# Patient Record
Sex: MALE | Race: WHITE | NOT HISPANIC OR LATINO | URBAN - METROPOLITAN AREA
[De-identification: names, ages, dates, MRNs, and addresses within clinical notes are randomized per-mention and may not be internally consistent; named-entity substitution may affect disease eponyms.]

---

## 2024-01-27 ENCOUNTER — OFFICE VISIT (OUTPATIENT)
Dept: URGENT CARE | Facility: CLINIC | Age: 24
End: 2024-01-27
Payer: COMMERCIAL

## 2024-01-27 VITALS
TEMPERATURE: 98 F | HEART RATE: 81 BPM | WEIGHT: 139.75 LBS | HEIGHT: 70 IN | OXYGEN SATURATION: 100 % | BODY MASS INDEX: 20.01 KG/M2 | SYSTOLIC BLOOD PRESSURE: 115 MMHG | RESPIRATION RATE: 10 BRPM | DIASTOLIC BLOOD PRESSURE: 78 MMHG

## 2024-01-27 DIAGNOSIS — M25.511 ACUTE PAIN OF RIGHT SHOULDER: ICD-10-CM

## 2024-01-27 DIAGNOSIS — R07.1 CHEST PAIN ON BREATHING: ICD-10-CM

## 2024-01-27 DIAGNOSIS — S46.811A STRAIN OF RIGHT TRAPEZIUS MUSCLE, INITIAL ENCOUNTER: ICD-10-CM

## 2024-01-27 DIAGNOSIS — R20.0 NUMBNESS AND TINGLING OF RIGHT ARM: ICD-10-CM

## 2024-01-27 DIAGNOSIS — R20.2 NUMBNESS AND TINGLING OF RIGHT ARM: ICD-10-CM

## 2024-01-27 DIAGNOSIS — R07.9 CHEST PAIN, UNSPECIFIED TYPE: Primary | ICD-10-CM

## 2024-01-27 PROCEDURE — 99204 OFFICE O/P NEW MOD 45 MIN: CPT | Mod: 25,S$GLB,, | Performed by: NURSE PRACTITIONER

## 2024-01-27 PROCEDURE — 93005 ELECTROCARDIOGRAM TRACING: CPT | Mod: S$GLB,,, | Performed by: NURSE PRACTITIONER

## 2024-01-27 PROCEDURE — 93010 ELECTROCARDIOGRAM REPORT: CPT | Mod: S$GLB,,, | Performed by: INTERNAL MEDICINE

## 2024-01-27 PROCEDURE — 71046 X-RAY EXAM CHEST 2 VIEWS: CPT | Mod: S$GLB,,, | Performed by: RADIOLOGY

## 2024-01-27 PROCEDURE — 96372 THER/PROPH/DIAG INJ SC/IM: CPT | Mod: S$GLB,,, | Performed by: NURSE PRACTITIONER

## 2024-01-27 RX ORDER — KETOROLAC TROMETHAMINE 30 MG/ML
30 INJECTION, SOLUTION INTRAMUSCULAR; INTRAVENOUS
Status: COMPLETED | OUTPATIENT
Start: 2024-01-27 | End: 2024-01-27

## 2024-01-27 RX ADMIN — KETOROLAC TROMETHAMINE 30 MG: 30 INJECTION, SOLUTION INTRAMUSCULAR; INTRAVENOUS at 04:01

## 2024-01-27 NOTE — PATIENT INSTRUCTIONS
Rest  Hydration/increase fluids  Hot showers  Heating pad  OTC Topical Pain relievers as directed: SalonPas, Tiger Balm,  Tylenol every 6 hours as needed for pain  You received a Ketoralac injection in clinic today-AVOID additional NSAIDs until tomorrow  Initiate Methocarbamol and take as directed  Signs and symptoms of worsening discussed  Follow up as needed/with worsening  Present to ED for s/s emergent changes/worsening

## 2024-01-27 NOTE — PROGRESS NOTES
"Subjective:      Patient ID: Vamsi Chan is a 23 y.o. male.    Vitals:  height is 5' 10" (1.778 m) and weight is 63.4 kg (139 lb 12.4 oz). His tympanic temperature is 98 °F (36.7 °C). His blood pressure is 115/78 and his pulse is 81. His respiration is 10 and oxygen saturation is 100%.     Chief Complaint: Chest Pain    23 year old male presents for evaluation of chest pain (right side, constant squeezing sensation 4/10, pain increases up to 8/10 sharp sensation with deep breaths) and right arm numbness (down to right elbow) and tingling (down to right hand). States that pain started immediately after losing a $20 sports bet when he transitioned from lying down to sitting up. Reports that he is unable to take a deep breath because of the pain. Reports worsening pain/numbness/tingling since onset. Denies caffeine/energy drink/coffee consumption today. Denies tobacco use. 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 Denies heavy lifting for over a year secondary to injury. Has RX for Methocarbamol but needs to  from the pharmacy. Did not take any medications for symptoms.    Chest Pain   This is a new problem. The current episode started today. The onset quality is sudden. The problem occurs constantly. The problem has been gradually worsening. The pain is present in the substernal region. The pain is at a severity of 6/10. The pain is moderate. The quality of the pain is described as squeezing and sharp (when breathing deep it's a sharp pain). The pain radiates to the right arm. Associated symptoms include numbness (right arm to elbow). Pertinent negatives include no abdominal pain, back pain, claudication, cough, diaphoresis, dizziness, exertional chest pressure, fever, headaches, hemoptysis, irregular heartbeat, leg pain, lower extremity edema, malaise/fatigue, nausea, near-syncope, orthopnea, palpitations, PND, shortness of breath, sputum production, syncope, vomiting or weakness. The pain is " aggravated by breathing. He has tried nothing for the symptoms.   Pertinent negatives for past medical history include no seizures.       Constitution: Negative. Negative for sweating and fever.   HENT: Negative.     Neck: neck negative.   Cardiovascular:  Positive for chest pain (right chest squeezing sensation). Negative for leg swelling, palpitations, sob on exertion and passing out.   Eyes: Negative.    Respiratory:  Negative for cough, sputum production, bloody sputum, shortness of breath and wheezing.    Gastrointestinal: Negative.  Negative for abdominal pain, nausea and vomiting.   Endocrine: negative.   Genitourinary: Negative.    Musculoskeletal:  Positive for joint pain (right shoulder). Negative for back pain.   Skin: Negative.    Allergic/Immunologic: Negative.    Neurological:  Positive for numbness (right arm to elbow) and tingling (right arm to hand). Negative for dizziness, history of vertigo, light-headedness, passing out, facial drooping, speech difficulty, coordination disturbances, loss of balance, headaches, seizures and tremors.   Hematologic/Lymphatic: Negative.    Psychiatric/Behavioral: Negative.        Objective:     Physical Exam   Constitutional: He is oriented to person, place, and time. He is cooperative.  Non-toxic appearance. He does not appear ill. He appears distressed. normalawake  HENT:   Head: Normocephalic and atraumatic.   Ears:   Right Ear: Hearing normal.   Left Ear: Hearing normal.   Eyes: Conjunctivae are normal. Pupils are equal, round, and reactive to light. No visual field deficit is present. Right eye exhibits no discharge. Left eye exhibits no discharge. No scleral icterus. Extraocular movement intact   Neck: Neck supple.   Cardiovascular: Normal rate, regular rhythm and normal heart sounds.   Pulmonary/Chest: Effort normal and breath sounds normal. No stridor. No respiratory distress. He has no wheezes. He has no rhonchi. He has no rales. He exhibits no tenderness.        Abdominal: Normal appearance.   Musculoskeletal: Normal range of motion.         General: Tenderness present. Normal range of motion.      Right shoulder: He exhibits tenderness and bony tenderness. He exhibits normal range of motion, no swelling, no effusion, no crepitus, no deformity, no laceration, normal pulse and normal strength.      Left shoulder: Normal.      Right elbow: Normal.     Right wrist: Normal.      Right upper arm: Normal. He exhibits no tenderness, no bony tenderness, no swelling, no edema, no deformity and no laceration.      Right forearm: Normal.        Arms:       Right hand: Normal.      Right lower leg: No edema.      Left lower leg: No edema.      Comments: Localized pain, TTP     Neurological: no focal deficit. He is alert and oriented to person, place, and time. He has normal motor skills, normal sensation and intact cranial nerves (2-12). He displays no weakness, no atrophy, no tremor, facial symmetry and no dysarthria. No cranial nerve deficit. He exhibits normal muscle tone. Gait and coordination normal.   Skin: Skin is warm, dry and not diaphoretic.   Psychiatric: His behavior is normal. Mood, judgment and thought content normal.   Nursing note and vitals reviewed.    XR CHEST PA AND LATERAL    Result Date: 1/27/2024  EXAM: XR CHEST PA AND LATERAL CLINICAL HISTORY:  Chest pain PRIOR:  NONE FINDINGS: The cardiomediastinal is normal and the lungs are clear.     No acute cardiopulmonary disease. Finalized on: 1/27/2024 4:07 PM By:  Stacy Huizar MD [facility]# [id number]      2024-01-27 16:[id number]    [facility]     EKG: normal EKG, normal sinus rhythm, there are no previous tracings available for comparison, normal sinus rhythm.    Assessment:     1. Chest pain, unspecified type    2. Acute pain of right shoulder    3. Strain of right trapezius muscle, initial encounter    4. Numbness and tingling of right arm    5. Chest pain on breathing        Plan:       Chest pain, unspecified type  -      XR CHEST PA AND LATERAL; Future; Expected date: 01/27/2024  -     IN OFFICE EKG 12-LEAD (to Muse)  -     ketorolac injection 30 mg    Acute pain of right shoulder  -     ketorolac injection 30 mg    Strain of right trapezius muscle, initial encounter  -     ketorolac injection 30 mg    Numbness and tingling of right arm    Chest pain on breathing           Patient presents for evaluation of right chest pain that is consistent with myalgia. Decision to perform EKG and CXR to rule out cardiac/pulmonary etiology, (-) findings discussed. Decision to administer Ketoralac 30 mg IM to manage pain and inflammation. Plan is to manage symptoms, prevent worsening, and follow up as needed/with worsening.discussed with patient who verbalizes understanding.        Patient Instructions   Rest  Hydration/increase fluids  Hot showers  Heating pad  OTC Topical Pain relievers as directed: SalonPas, Tiger Balm,  Tylenol every 6 hours as needed for pain  You received a Ketoralac injection in clinic today-AVOID additional NSAIDs until tomorrow  Initiate Methocarbamol and take as directed  Signs and symptoms of worsening discussed  Follow up as needed/with worsening  Present to ED for s/s emergent changes/worsening

## 2024-01-28 ENCOUNTER — TELEPHONE (OUTPATIENT)
Dept: URGENT CARE | Facility: CLINIC | Age: 24
End: 2024-01-28
Payer: COMMERCIAL

## 2024-01-30 ENCOUNTER — TELEPHONE (OUTPATIENT)
Dept: URGENT CARE | Facility: CLINIC | Age: 24
End: 2024-01-30
Payer: COMMERCIAL

## 2024-01-30 DIAGNOSIS — R94.31 ABNORMAL EKG: Primary | ICD-10-CM

## 2024-02-01 ENCOUNTER — OFFICE VISIT (OUTPATIENT)
Dept: CARDIOLOGY | Facility: CLINIC | Age: 24
End: 2024-02-01
Payer: COMMERCIAL

## 2024-02-01 ENCOUNTER — LAB VISIT (OUTPATIENT)
Dept: LAB | Facility: HOSPITAL | Age: 24
End: 2024-02-01
Attending: INTERNAL MEDICINE
Payer: COMMERCIAL

## 2024-02-01 VITALS
OXYGEN SATURATION: 99 % | DIASTOLIC BLOOD PRESSURE: 76 MMHG | WEIGHT: 139.31 LBS | SYSTOLIC BLOOD PRESSURE: 114 MMHG | BODY MASS INDEX: 19.94 KG/M2 | HEART RATE: 94 BPM | HEIGHT: 70 IN

## 2024-02-01 DIAGNOSIS — R94.31 EKG ABNORMALITIES: ICD-10-CM

## 2024-02-01 DIAGNOSIS — R07.89 OTHER CHEST PAIN: ICD-10-CM

## 2024-02-01 DIAGNOSIS — R07.89 OTHER CHEST PAIN: Primary | ICD-10-CM

## 2024-02-01 LAB — D DIMER PPP IA.FEU-MCNC: <0.19 MG/L FEU

## 2024-02-01 PROCEDURE — 85379 FIBRIN DEGRADATION QUANT: CPT | Performed by: INTERNAL MEDICINE

## 2024-02-01 PROCEDURE — 3008F BODY MASS INDEX DOCD: CPT | Mod: CPTII,S$GLB,, | Performed by: INTERNAL MEDICINE

## 2024-02-01 PROCEDURE — 99999 PR PBB SHADOW E&M-EST. PATIENT-LVL III: CPT | Mod: PBBFAC,,, | Performed by: INTERNAL MEDICINE

## 2024-02-01 PROCEDURE — 3074F SYST BP LT 130 MM HG: CPT | Mod: CPTII,S$GLB,, | Performed by: INTERNAL MEDICINE

## 2024-02-01 PROCEDURE — 99204 OFFICE O/P NEW MOD 45 MIN: CPT | Mod: S$GLB,,, | Performed by: INTERNAL MEDICINE

## 2024-02-01 PROCEDURE — 3078F DIAST BP <80 MM HG: CPT | Mod: CPTII,S$GLB,, | Performed by: INTERNAL MEDICINE

## 2024-02-01 PROCEDURE — 1159F MED LIST DOCD IN RCRD: CPT | Mod: CPTII,S$GLB,, | Performed by: INTERNAL MEDICINE

## 2024-02-01 PROCEDURE — 36415 COLL VENOUS BLD VENIPUNCTURE: CPT | Performed by: INTERNAL MEDICINE

## 2024-02-01 PROCEDURE — 1160F RVW MEDS BY RX/DR IN RCRD: CPT | Mod: CPTII,S$GLB,, | Performed by: INTERNAL MEDICINE

## 2024-02-01 NOTE — PROGRESS NOTES
Subjective:   Patient ID:  Vamsi Chan is a 23 y.o. male who presents for evaluation of Chest Pain (Sharp chest pain that occurred while watching tv, would also hurt when he took deep breathes ) and Numbness (Numbness in right arm)      22 yo male, referred by Chaparrita for abnl EKG  PMH L5 S1 lumbar disc injured 1 yr ago at  training.     5 days ago, when watching the basketball in the dorm,  abrupt chest pain on right side, radiating tonight arm and shoulder. Associated SOB nausea cold sweating. Quickly Went to [facility] urgent [facility]. CXRnegative.   EKG reviewed by myself today reveals NSR nonspecific STT change, pulm pattern,  Given Toradol IM and d/c home  Took muscle relaxant at home. Improved pain and SOB. Arm sore. Next day resolved,  Occasional left side chest pain  No smoking, occasional drinking, no drugs  No surgical history. H/o + strep throat infection Rx with ABx  No f/h premature SCD  jogging            No results found for this or any previous visit.     No results found for this or any previous visit.       History reviewed. No pertinent past medical history.    History reviewed. No pertinent surgical history.    Social History     Tobacco Use    Smoking status: Never     Passive exposure: Never    Smokeless tobacco: Never   Substance Use Topics    Alcohol use: Yes    Drug use: Never       History reviewed. No pertinent family history.    Review of Systems   Constitutional: Negative for decreased appetite, diaphoresis, fever, malaise/fatigue and night sweats.   HENT:  Negative for nosebleeds.    Eyes:  Negative for blurred vision and double vision.   Cardiovascular:  Positive for chest pain. Negative for claudication, dyspnea on exertion, irregular heartbeat, leg swelling, near-syncope, orthopnea, palpitations, paroxysmal nocturnal dyspnea and syncope.   Respiratory:  Negative for cough, shortness of breath, sleep disturbances due to breathing, snoring, sputum production and wheezing.   "  Endocrine: Negative for cold intolerance and polyuria.   Hematologic/Lymphatic: Does not bruise/bleed easily.   Skin:  Negative for rash.   Musculoskeletal:  Positive for back pain. Negative for falls, joint pain, joint swelling and neck pain.   Gastrointestinal:  Negative for abdominal pain, heartburn, nausea and vomiting.   Genitourinary:  Negative for dysuria, frequency and hematuria.   Neurological:  Negative for difficulty with concentration, dizziness, focal weakness, headaches, light-headedness, numbness, seizures and weakness.   Psychiatric/Behavioral:  Negative for depression, memory loss and substance abuse. The patient does not have insomnia.    Allergic/Immunologic: Negative for HIV exposure and hives.       Objective:   Physical Exam  HENT:      Head: Normocephalic.   Eyes:      Pupils: Pupils are equal, round, and reactive to light.   Neck:      Thyroid: No thyromegaly.      Vascular: Normal carotid pulses. No carotid bruit or JVD.   Cardiovascular:      Rate and Rhythm: Normal rate and regular rhythm. No extrasystoles are present.     Chest Wall: PMI is not displaced.      Pulses: Normal pulses.      Heart sounds: Normal heart sounds. No murmur heard.     No gallop. No S3 sounds.   Pulmonary:      Effort: No respiratory distress.      Breath sounds: Normal breath sounds. No stridor.   Abdominal:      General: Bowel sounds are normal.      Palpations: Abdomen is soft.      Tenderness: There is no abdominal tenderness. There is no rebound.   Skin:     Findings: No rash.   Neurological:      Mental Status: He is alert and oriented to person, place, and time.   Psychiatric:         Behavior: Behavior normal.         No results found for: "CHOL"  No results found for: "HDL"  No results found for: "LDLCALC"  No results found for: "TRIG"  No results found for: "CHOLHDL"    Chemistry    No results found for: "NA", "K", "CL", "CO2", "BUN", "CREATININE", "GLU" No results found for: "CALCIUM", "ALKPHOS", " ""AST", "ALT", "BILITOT", "ESTGFRAFRICA", "EGFRNONAA"       No results found for: "LABA1C", "HGBA1C"  No results found for: "TSH"  No results found for: "INR", "PROTIME"  No results found for: "WBC", "HGB", "HCT", "MCV", "PLT"  BNP  @LABRCNTIP(BNP,BNPTRIAGEBLO)@  CrCl cannot be calculated (No successful lab value found.).  No results found in the last 24 hours.  No results found in the last 24 hours.  No results found in the last 24 hours.    Assessment:      1. Other chest pain    2. EKG abnormalities        Plan:   Other chest pain  -     D-Dimer, Quantitative; Future; Expected date: 02/01/2024  -     Echo; Future    EKG abnormalities  -     D-Dimer, Quantitative; Future; Expected date: 02/01/2024  -     Echo; Future          D dimer r/o pE  Echo r/o pericardial effusion and other etiologies for CP and abnl EKG  Phone review    Pain control per PCP       "

## 2024-02-02 ENCOUNTER — TELEPHONE (OUTPATIENT)
Dept: CARDIOLOGY | Facility: CLINIC | Age: 24
End: 2024-02-02
Payer: COMMERCIAL

## 2024-02-02 NOTE — TELEPHONE ENCOUNTER
Attempted to contact patient; LVM for patient to call back for results.         ----- Message from Ac Metcalf MD sent at 2/1/2024  6:17 PM CST -----  D dimer normal . No acute PE

## 2024-02-07 ENCOUNTER — TELEPHONE (OUTPATIENT)
Dept: CARDIOLOGY | Facility: CLINIC | Age: 24
End: 2024-02-07
Payer: COMMERCIAL

## 2024-02-07 ENCOUNTER — HOSPITAL ENCOUNTER (OUTPATIENT)
Dept: CARDIOLOGY | Facility: HOSPITAL | Age: 24
Discharge: HOME | End: 2024-02-07
Attending: INTERNAL MEDICINE
Payer: COMMERCIAL

## 2024-02-07 DIAGNOSIS — R07.89 OTHER CHEST PAIN: ICD-10-CM

## 2024-02-07 DIAGNOSIS — R94.31 EKG ABNORMALITIES: ICD-10-CM

## 2024-02-07 LAB
AV INDEX (PROSTH): 0.84
AV MEAN GRADIENT: 3 MMHG
AV PEAK GRADIENT: 5 MMHG
AV VALVE AREA BY VELOCITY RATIO: 2.83 CM²
AV VALVE AREA: 2.72 CM²
AV VELOCITY RATIO: 0.87
CV ECHO LV RWT: 0.4 CM
DOP CALC AO PEAK VEL: 1.11 M/S
DOP CALC AO VTI: 21.5 CM
DOP CALC LVOT AREA: 3.2 CM2
DOP CALC LVOT DIAMETER: 2.03 CM
DOP CALC LVOT PEAK VEL: 0.97 M/S
DOP CALC LVOT STROKE VOLUME: 58.55 CM3
DOP CALC RVOT PEAK VEL: 1.06 M/S
DOP CALC RVOT VTI: 21.3 CM
DOP CALCLVOT PEAK VEL VTI: 18.1 CM
E WAVE DECELERATION TIME: 301.05 MSEC
E/A RATIO: 1.59
E/E' RATIO: 7.36 M/S
ECHO LV POSTERIOR WALL: 0.9 CM (ref 0.6–1.1)
EJECTION FRACTION: 55 %
FRACTIONAL SHORTENING: 34 % (ref 28–44)
INTERVENTRICULAR SEPTUM: 0.9 CM (ref 0.6–1.1)
IVRT: 106.57 MSEC
LA MAJOR: 3.48 CM
LA MINOR: 3.71 CM
LA WIDTH: 3.1 CM
LEFT ATRIUM SIZE: 2.69 CM
LEFT ATRIUM VOLUME MOD: 23.2 CM3
LEFT ATRIUM VOLUME: 25.46 CM3
LEFT INTERNAL DIMENSION IN SYSTOLE: 2.93 CM (ref 2.1–4)
LEFT VENTRICLE DIASTOLIC VOLUME: 91 ML
LEFT VENTRICLE SYSTOLIC VOLUME: 33.01 ML
LEFT VENTRICULAR INTERNAL DIMENSION IN DIASTOLE: 4.47 CM (ref 3.5–6)
LEFT VENTRICULAR MASS: 131.37 G
LV LATERAL E/E' RATIO: 7.36 M/S
LV SEPTAL E/E' RATIO: 7.36 M/S
LVOT MG: 2.08 MMHG
LVOT MV: 0.67 CM/S
MV PEAK A VEL: 0.51 M/S
MV PEAK E VEL: 0.81 M/S
PISA TR MAX VEL: 2.32 M/S
PULM VEIN S/D RATIO: 1.11
PV MEAN GRADIENT: 2 MMHG
PV MV: 0.7 M/S
PV PEAK D VEL: 0.62 M/S
PV PEAK GRADIENT: 4 MMHG
PV PEAK S VEL: 0.69 M/S
PV PEAK VELOCITY: 1 M/S
RA MAJOR: 4.12 CM
RA PRESSURE ESTIMATED: 3 MMHG
RA WIDTH: 2.81 CM
RIGHT VENTRICULAR END-DIASTOLIC DIMENSION: 3.41 CM
RV TB RVSP: 5 MMHG
SINUS: 2.74 CM
STJ: 2.65 CM
TDI LATERAL: 0.11 M/S
TDI SEPTAL: 0.11 M/S
TDI: 0.11 M/S
TR MAX PG: 22 MMHG
TRICUSPID ANNULAR PLANE SYSTOLIC EXCURSION: 2.43 CM
TV REST PULMONARY ARTERY PRESSURE: 25 MMHG

## 2024-02-07 PROCEDURE — 93306 TTE W/DOPPLER COMPLETE: CPT | Mod: 26,,, | Performed by: INTERNAL MEDICINE

## 2024-02-07 PROCEDURE — 93306 TTE W/DOPPLER COMPLETE: CPT

## 2024-02-07 NOTE — TELEPHONE ENCOUNTER
Lvm to pt in regards to results      ----- Message from Ac Metcalf MD sent at 2/7/2024  4:32 PM CST -----  Echo showed normal function and valvular structures  No pericardial effusion  Continue motrin rx as needed  hydration

## 2024-09-17 ENCOUNTER — OFFICE VISIT (OUTPATIENT)
Dept: URGENT CARE | Facility: CLINIC | Age: 24
End: 2024-09-17
Payer: COMMERCIAL

## 2024-09-17 VITALS
RESPIRATION RATE: 18 BRPM | WEIGHT: 146.5 LBS | TEMPERATURE: 99 F | HEIGHT: 70 IN | HEART RATE: 97 BPM | DIASTOLIC BLOOD PRESSURE: 59 MMHG | SYSTOLIC BLOOD PRESSURE: 120 MMHG | BODY MASS INDEX: 20.97 KG/M2 | OXYGEN SATURATION: 97 %

## 2024-09-17 DIAGNOSIS — J02.9 SORE THROAT: ICD-10-CM

## 2024-09-17 DIAGNOSIS — J02.9 VIRAL PHARYNGITIS: Primary | ICD-10-CM

## 2024-09-17 LAB
CTP QC/QA: YES
CTP QC/QA: YES
MOLECULAR STREP A: NEGATIVE
SARS-COV-2 AG RESP QL IA.RAPID: NEGATIVE

## 2024-09-17 PROCEDURE — 87651 STREP A DNA AMP PROBE: CPT | Mod: QW,S$GLB,,

## 2024-09-17 PROCEDURE — 87811 SARS-COV-2 COVID19 W/OPTIC: CPT | Mod: QW,S$GLB,,

## 2024-09-17 PROCEDURE — 99213 OFFICE O/P EST LOW 20 MIN: CPT | Mod: S$GLB,,,

## 2024-09-17 NOTE — PATIENT INSTRUCTIONS
PLEASE READ YOUR DISCHARGE INSTRUCTIONS ENTIRELY AS IT CONTAINS IMPORTANT INFORMATION.    Please drink plenty of fluids.    Please get plenty of rest.    Please return here or go to the Emergency Department for any concerns or worsening of condition.    Please take an over the counter antihistamine medication (Allegra/Claritin/Zyrtec) of your choice as directed for allergy symptoms and/or runny nose and postnasal drip.    Try an over the counter decongestant for sinus pressure/ear pressure, congestion symptoms like Mucinex D or Sudafed or Phenylephrine. You buy this behind the pharmacy counter.    If you do have Hypertension or palpitations, it is safe to take Coricidin HBP for relief of sinus symptoms.    Tylenol or ibuprofen can also be used as directed for pain and fever unless you have an allergy to them or medical condition such as stomach ulcers, kidney or liver disease or blood thinners etc for which you should not be taking these type of medications.     Sore throat recommendations: Warm fluids, warm salt water gargles, throat lozenges, tea, honey, soup, rest, hydration.    Use over the counter Flonase or Nasocort: one spray each nostril twice daily OR two sprays each nostril once daily until nares dry out, unless you have Glaucoma.   Can also supplement with nasal saline rinse.    Sinus rinses DO NOT USE TAP WATER, if you must, water must be at a rolling boil for 1 minute, let it cool, then use.  May use distilled water, or over the counter nasal saline rinses.  Amdhav's vapor rub in shower to help open nasal passages.  May use nasal gel to keep passages moisturized.  May use nasal saline sprays during the day for added relief of congestion.   For those who go to the gym, please do not use the sauna or steam room now to clear sinuses.    Cough     Rest and fluids are important  Can use honey with debra to soothe your throat    Robitussin or Delsyum for cough suppressant for dry cough.    Mucinex DM or  products containing Guaifenesin or Dextromethorphan for expectorant (wet cough).    Take prescription cough meds (pills) as prescribed; take prescription cough syrup at night as needed for cough.  Do not take both the prescribed cough pills and syrup at the same time or within 6 hours of each other.  Do not take the cough syrup with any other sedative medication as it can can cause drowsiness. Do not operate any heavy machinery, drink or drive while taking the cough syrup.     Please follow up with your primary care doctor or specialist in the next 48-72hrs as needed and if no improvement    If you smoke, please stop smoking.    Please return or see your primary care doctor if you develop new or worsening symptoms.     Lastly, good hand washing and cough hygiene (cough into your elbow) will help prevent the spread of the illness. A general rule is that you are no longer contagious once you have been without a fever for over 24 hours without requiring fever reducing medications.     Please arrange follow up with your primary medical clinic as soon as possible. You must understand that you've received an Urgent Care treatment only and that you may be released before all of your medical problems are known or treated. You, the patient, will arrange for follow up as instructed. If your symptoms worsen or fail to improve you should go to the Emergency Room.

## 2024-09-17 NOTE — PROGRESS NOTES
"Subjective:      Patient ID: Vamsi Chan is a 23 y.o. male.    Vitals:  height is 5' 10" (1.778 m) and weight is 66.4 kg (146 lb 7.9 oz). His oral temperature is 99.1 °F (37.3 °C). His blood pressure is 120/59 (abnormal) and his pulse is 97. His respiration is 18 and oxygen saturation is 97%.     Chief Complaint: Sore Throat    Vamsi Chan is a 23 y.o. male who presents for sore throat which onset 2 days ago. Associated sxs include HA. Patient denies any fever, chills, SOB, CP, abd pain, n/v/d, rash, dizziness, or numbness/tingling. Prior Tx includes Dayquil and tylenol. She notes she had strep 3 times last year.    Sore Throat   This is a new problem. Episode onset: 2 days ago. The problem has been gradually worsening. Sore throat worse side: both. Maximum temperature: never checked it. The fever has been present for Less than 1 day. The pain is at a severity of 4/10. The pain is mild. Associated symptoms include headaches. Pertinent negatives include no abdominal pain, congestion, coughing, diarrhea, drooling, ear discharge, ear pain, hoarse voice, plugged ear sensation, neck pain, shortness of breath, stridor, swollen glands, trouble swallowing or vomiting. He has had no exposure to strep or mono. He has tried acetaminophen (dayquil) for the symptoms. The treatment provided mild relief.       Constitution: Negative for appetite change, chills, sweating, fatigue and fever.   HENT:  Positive for sore throat. Negative for ear pain, ear discharge, foreign body in ear, hearing loss, drooling, congestion, postnasal drip, sinus pain, sinus pressure and trouble swallowing.    Neck: Negative for neck pain.   Cardiovascular:  Negative for chest pain.   Respiratory:  Negative for cough, sputum production, shortness of breath and stridor.    Gastrointestinal:  Negative for abdominal pain, nausea, vomiting and diarrhea.   Musculoskeletal:  Negative for muscle ache.   Neurological:  Positive for headaches. Negative " for dizziness, numbness and tingling.      Objective:     Physical Exam   Constitutional: He is oriented to person, place, and time. He appears well-developed. He is cooperative.  Non-toxic appearance. He does not appear ill. No distress.   HENT:   Head: Normocephalic and atraumatic.   Ears:   Right Ear: Hearing, tympanic membrane, external ear and ear canal normal.   Left Ear: Hearing, tympanic membrane, external ear and ear canal normal.   Nose: Nose normal. No mucosal edema or nasal deformity. No epistaxis. Right sinus exhibits no maxillary sinus tenderness and no frontal sinus tenderness. Left sinus exhibits no maxillary sinus tenderness and no frontal sinus tenderness.   Mouth/Throat: Uvula is midline and mucous membranes are normal. Mucous membranes are moist. No trismus in the jaw. Normal dentition. No uvula swelling. Posterior oropharyngeal erythema present. No oropharyngeal exudate or posterior oropharyngeal edema. No tonsillar exudate.   Eyes: Conjunctivae and lids are normal. No scleral icterus. Extraocular movement intact   Neck: Trachea normal and phonation normal. Neck supple. No edema present. No erythema present. No neck rigidity present.   Cardiovascular: Normal rate, regular rhythm, normal heart sounds and normal pulses.   Pulmonary/Chest: Effort normal and breath sounds normal. No stridor. No respiratory distress. He has no decreased breath sounds. He has no wheezes. He has no rhonchi. He has no rales.   Abdominal: Normal appearance.   Musculoskeletal: Normal range of motion.         General: No deformity. Normal range of motion.   Neurological: He is alert and oriented to person, place, and time. He exhibits normal muscle tone. Coordination normal.   Skin: Skin is warm, dry, intact, not diaphoretic and not pale.   Psychiatric: His speech is normal and behavior is normal. Judgment and thought content normal.   Nursing note and vitals reviewed.      Assessment:     1. Viral pharyngitis    2. Sore  throat        Results for orders placed or performed in visit on 09/17/24   POCT Strep A, Molecular   Result Value Ref Range    Molecular Strep A, POC Negative Negative     Acceptable Yes    SARS Coronavirus 2 Antigen, POCT Manual Read   Result Value Ref Range    SARS Coronavirus 2 Antigen Negative Negative     Acceptable Yes        Plan:       Viral pharyngitis    Sore throat  -     POCT Strep A, Molecular  -     SARS Coronavirus 2 Antigen, POCT Manual Read  -     (Magic mouthwash) 1:1:1 diphenhydrAMINE(Benadryl) 12.5mg/5ml liq, aluminum & magnesium hydroxide-simethicone (Maalox), LIDOcaine viscous 2%; Swish and spit 5 mLs every 4 (four) hours as needed (for sore throat).  Dispense: 90 mL; Refill: 0      Afebrile. VSS. Patient is in NAD.  Discussed negative results with patient.  Educated patient on viral vs bacterial sinus infection/upper respiratory infection.   Supportive care for sore throat (salt water gargles, lozenges, chloraseptic spray, cough drops, warm tea, honey, etc.).   Increase fluid intake and plenty of rest.  Tylenol/Ibuprofen (as permitted) as needed for any pain or discomfort.  If symptoms do not resolve, return to clinic for further evaluation.  ER precautions given such as SOB, CP, or fever not resolved with fever-reducing medications.

## 2025-06-04 ENCOUNTER — OFFICE VISIT (OUTPATIENT)
Dept: URGENT CARE | Facility: CLINIC | Age: 25
End: 2025-06-04
Payer: COMMERCIAL

## 2025-06-04 VITALS
HEART RATE: 95 BPM | SYSTOLIC BLOOD PRESSURE: 125 MMHG | OXYGEN SATURATION: 98 % | WEIGHT: 146 LBS | DIASTOLIC BLOOD PRESSURE: 79 MMHG | HEIGHT: 70 IN | TEMPERATURE: 99 F | BODY MASS INDEX: 20.9 KG/M2 | RESPIRATION RATE: 18 BRPM

## 2025-06-04 DIAGNOSIS — J02.9 SORE THROAT: Primary | ICD-10-CM

## 2025-06-04 DIAGNOSIS — R09.82 POSTNASAL DRIP: ICD-10-CM

## 2025-06-04 LAB
CTP QC/QA: YES
CTP QC/QA: YES
MOLECULAR STREP A: NEGATIVE
POC MOLECULAR INFLUENZA A AGN: NEGATIVE
POC MOLECULAR INFLUENZA B AGN: NEGATIVE

## 2025-06-04 PROCEDURE — 87651 STREP A DNA AMP PROBE: CPT | Mod: QW,S$GLB,, | Performed by: PHYSICIAN ASSISTANT

## 2025-06-04 PROCEDURE — 87502 INFLUENZA DNA AMP PROBE: CPT | Mod: QW,S$GLB,, | Performed by: PHYSICIAN ASSISTANT

## 2025-06-04 PROCEDURE — 99214 OFFICE O/P EST MOD 30 MIN: CPT | Mod: S$GLB,,, | Performed by: PHYSICIAN ASSISTANT

## 2025-06-04 RX ORDER — IPRATROPIUM BROMIDE 21 UG/1
2 SPRAY, METERED NASAL 2 TIMES DAILY
Qty: 30 ML | Refills: 0 | Status: SHIPPED | OUTPATIENT
Start: 2025-06-04